# Patient Record
Sex: FEMALE | Race: WHITE | NOT HISPANIC OR LATINO | Employment: FULL TIME
[De-identification: names, ages, dates, MRNs, and addresses within clinical notes are randomized per-mention and may not be internally consistent; named-entity substitution may affect disease eponyms.]

---

## 2024-01-01 ENCOUNTER — HEALTH MAINTENANCE LETTER (OUTPATIENT)
Age: 59
End: 2024-01-01

## 2024-01-01 ENCOUNTER — PRE VISIT (OUTPATIENT)
Dept: ONCOLOGY | Facility: CLINIC | Age: 59
End: 2024-01-01

## 2024-01-01 ENCOUNTER — PATIENT OUTREACH (OUTPATIENT)
Dept: ONCOLOGY | Facility: CLINIC | Age: 59
End: 2024-01-01

## 2024-01-01 ENCOUNTER — TRANSFERRED RECORDS (OUTPATIENT)
Dept: HEALTH INFORMATION MANAGEMENT | Facility: CLINIC | Age: 59
End: 2024-01-01

## 2024-01-01 ENCOUNTER — TRANSCRIBE ORDERS (OUTPATIENT)
Dept: OTHER | Age: 59
End: 2024-01-01

## 2024-01-01 ENCOUNTER — ONCOLOGY VISIT (OUTPATIENT)
Dept: ONCOLOGY | Facility: CLINIC | Age: 59
End: 2024-01-01
Attending: OBSTETRICS & GYNECOLOGY

## 2024-01-01 VITALS
RESPIRATION RATE: 16 BRPM | BODY MASS INDEX: 21.95 KG/M2 | OXYGEN SATURATION: 95 % | SYSTOLIC BLOOD PRESSURE: 167 MMHG | HEART RATE: 95 BPM | DIASTOLIC BLOOD PRESSURE: 97 MMHG | HEIGHT: 69 IN | TEMPERATURE: 98 F | WEIGHT: 148.2 LBS

## 2024-01-01 DIAGNOSIS — C56.9 OVARIAN CANCER, UNSPECIFIED LATERALITY (H): Primary | ICD-10-CM

## 2024-01-01 DIAGNOSIS — C56.9: ICD-10-CM

## 2024-01-01 DIAGNOSIS — C56.1 OVARIAN CANCER, RIGHT (H): Primary | ICD-10-CM

## 2024-01-01 LAB
ALT SERPL-CCNC: 16 U/L (ref 9–52)
AST SERPL-CCNC: 37 U/L (ref 17–45)
CREATININE (EXTERNAL): 1 MG/DL (ref 0.7–1.5)
GFR ESTIMATED (EXTERNAL): 66 ML/MIN (ref 60–128)
GLUCOSE (EXTERNAL): 107 MG/DL (ref 70–110)
POTASSIUM (EXTERNAL): 4.2 MMOL/L (ref 3.5–5.5)

## 2024-01-01 PROCEDURE — 99205 OFFICE O/P NEW HI 60 MIN: CPT | Performed by: OBSTETRICS & GYNECOLOGY

## 2024-01-01 PROCEDURE — 99213 OFFICE O/P EST LOW 20 MIN: CPT | Performed by: OBSTETRICS & GYNECOLOGY

## 2024-01-01 ASSESSMENT — PAIN SCALES - GENERAL: PAINLEVEL: NO PAIN (0)

## 2024-03-22 NOTE — TELEPHONE ENCOUNTER
Action Taken  Date/Description  TJ     WT from NPS March 22, 2024 11:34 AM    Call from Pt's  Jay to schedule for Dx of 2nd Opinion Ovarian cancer, right (H) [C56.1]     Pt Dx'd: [facility]  by Dr. Roz Bunch  Has Pt been anywhere else for this condition/concern? [facility], [facility])  Records updated in Care Everywhere? [facility]   Prior history of Hematologist, Oncologist?  No  Previous Radiation: No  Genetic Testing conducted?  Would like to discuss further  Surgical procedures, biopsies? 2.19.24 [facility] Case: [id number]   Imaging done:   2.19.24 - Vaginal US  2.2.24 - CT AP    Any scheduled follow up's/imaging/surgical procedures/biopsies? No     *They will be traveling with family later today, so please don't call until Monday*

## 2024-03-22 NOTE — TELEPHONE ENCOUNTER
Action    Action Taken 3/22/24  Pt's  called back to advise pt is being referred to see Dr. Jazmyn Pitts.  11:57 AM

## 2024-03-22 NOTE — PROGRESS NOTES
New Patient Hematology / Oncology Nurse Navigator Note     Referral Date: 3/21/24    Referring provider: Per IB message from Dr. Pitts who discussed with referring MD directly/call from pt's  Jay Nuñez, Iban RIBEIRO MD   [address]   [address]   [place] [address]   Phone: [phone]   Fax: [phone]     Referring Clinic/Organization: [facility] (pt also met with [facility])     Referred to: GynOnc    Requested provider (if applicable): Dr. Pitts (Dr. Pitts to add 3/25)    Evaluation for : Carcinomatosis, concern for ovarian cancer     Clinical History (per Nurse review of records provided):    2/19/24 Path:  Final Dx    A.  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 Pitts from Dr Nuñez  Pt confirms they are aware of the referral and ready to schedule. Explained Dr. Pitts has offered to add patient Monday 3/25 at 10AM either in-person at [facility] or virtually. Pt would prefer in-person and Monday works for her.     Provided contact information if future questions arise.     -Transferred pt to NPS line [phone] to schedule appt per scheduling instructions.        Maribell Head, EVEN, RN, PHN, OCN  Hematology/Oncology Nurse Navigator  [facility]  [phone]

## 2024-03-22 NOTE — TELEPHONE ENCOUNTER
Action March 22, 2024 3:37 PM ABT   Action Taken Records from [facility] received and sent to HIM for upload. Images from [place] received and resolved to PACS     RECORDS STATUS - ALL OTHER DIAGNOSIS      [facility], [facility]   DATE RECEIVED:    NOTES STATUS DETAILS   OFFICE NOTE from referring provider [facility] 03/15/24: Dr. Iban Nuñez   OFFICE NOTE from medical oncologist [facility] 02/23/24: Dr. Rzo Bunch   OFFICE NOTE from other specialist External: [facility] 03/01/24: Luisana Nieto PA-C   MEDICATION LIST [facility]    LABS     PATHOLOGY REPORTS Report in [facility] 02/19/24: [id number]   ANYTHING RELATED TO DIAGNOSIS [facility] Most recent 02/19/24   IMAGING (NEED IMAGES & REPORT)     CT SCANS PACS RV:   02/19/24: CT Needle BX  02/02/24: CT AP   ULTRASOUND PACS RV:  02/09/24: US Pelvis

## 2024-03-25 PROBLEM — C56.9 OVARIAN CANCER, UNSPECIFIED LATERALITY (H): Status: ACTIVE | Noted: 2024-01-01

## 2024-03-25 NOTE — LETTER
3/25/2024         RE: Catalina Henley  [address] [address]        Dear Colleague,    Thank you for referring your patient, Catalina Henley, to the [facility]. Please see a copy of my visit note below.    GYNECOLOGIC  ONCOLOGY CONSULT    Referring provider:    Referred Self, MD  No address on file   RE: Catalina Henley  : 1965  ALEJANDRA: 3/25/2024    CC:  High grade serous ovarian cancer    HPI: Ms Catalina Henley is a 59 year old  female who presents for consultation regarding high grade serous ovarian cancer.     She presents with her  and sister for the consult.     Overall she prefers not to be involved with doctors and had not seen a doctor for nearly 17 yrs. 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  She presented to to clinic in early 2024 with several weeks history of lower abdominal pain, abdominal bloating.     Today she reports lower abdominal discomfort which is controlled with Advil. Decreased appetite and inability to tolerate some foods like vegetables. Continues to have daily stool, no vomiting. She is still very active but does have more fatigue recently. No vaginal bleeding or abnormal discharge.    It is important to her that during course of cancer treatment to not experience the very negative effects of cancer care she witnessed for her mother. Maintaining quality of life, being with her family is important.    Course to date  2024 CT A/P - Nodularity of omentum, nodularity of the peritoneal wall, multiple nodules in the mesial rectal fat, nodular thickening within rectouterine space, moderate volume ascites    24   -  2696  2/10/24 Pelvic US - numerous peritoneal lesions    24 CT guided biopsy of omental nodule  Pathology - metastatic carcinoma, consistent with high grade serous carcinoma, favoring tubo-ovarian    24 Consult with Gyn Oncology - declined Taxol and Carboplatin  "Neoadjuvant chemotherapy  3/15/24 consult Palliative Medicine Dr. Iban Nuñez, referred for second consult      OBGYN history and Health Maintenance:    Last Pap Smear: never  Last Mammogram:never  Last Colonoscopy: never       Past Medical History:   Diagnosis Date     Migraine with aura and without status migrainosus, not intractable        Past Surgical History:   Procedure Laterality Date     KNEE SURGERY       LAPAROSCOPIC TUBAL LIGATION Bilateral         No current outpatient medications on file.       No Known Allergies    Social History:  Social History     Tobacco Use     Smoking status: Never     Passive exposure: Never     Smokeless tobacco: Never   Substance Use Topics     Alcohol use: Never       Family History:   The patient's family history is notable for mother who passed away from endometrial cancer  No family history on file.      Physical Exam:     BP (!) 167/97   Pulse 95   Temp 98  F (36.7  C) (Oral)   Resp 16   Ht 1.74 m (5' 8.5\")   Wt 67.2 kg (148 lb 3.2 oz)   SpO2 95%   BMI 22.21 kg/m    Body mass index is 22.21 kg/m .    General: Alert and oriented, no acute distress  Psych: Mood stable  Cardiovascular: RRR, no murmors  Pulmonary: Lungs clear . Normal respiratory effort  GI: Lower pelvic mass, extending to level of umbilicus, mobile, minimal fluid shift  Lymph: No enlarge lymph nodes in neck or groin  : Normal external genitalia. 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    Mild cancer associated pain.     At this visit I reviewed the imaging, omental biopsy results and course of care to date and provided the below recommendation to Catalina and her family. We discussed at length Catalina's and her families value and her priority to " maintain a good quality of life for as long as possible. After a discussion of various approaches to ovarian cancer, together we arrived at the below treatment plan.    We will continue to support a comprehensive cancer care and can consider interventions such as paracentesis and close collaboration with palliative medicine as needed.       Plan:     1.)    Ovarian Cancer- at least Stage IIIC, currently the best approach to management is neoadjuvant    chemotherapy. It is appropriate to proceed with single agent Carboplatin IV AUC 6.    She agrees to proceed with cycle 1 and then reassess her tolerance and make decisions about subsequent cycles. She does not want hair loss.    If tolerated the overall plan is to administer 3-4 cycles and obtain CT Chest, abdomen and pelvis for disease response. Depending on response we would discuss role of surgical management with goal of optimal tumor resection.      She can decided to stop chemotherapy at anytime if it is interfering with how she wants to live her life    Will send off omental biopsy sample for CARIS tumor profiling testing. Will review results including potential role of PARP inhibitor pending results.    2.) Genetic risk factors were assessed: referral placed     3.) Labs and/or tests ordered include:  obtain pre-chemo labs including .     4.) Needs to connect with social work and financial team as she is self pay      Jazmyn Pitts M.D., MPH,  F.A.C.O.G.  Professor  Department of Ob/Gyn and Women's Health  Division of Gynecologic Oncology  [facility]/[employer] [place]  [phone]        Time: total time spent today, 60 , including preparation, review of outside records, face to face counseling, and documentation.            Again, thank you for allowing me to participate in the care of your patient.        Sincerely,        Jazmyn Pitts MD

## 2024-03-25 NOTE — PROGRESS NOTES
Writer received referral to Cancer Risk Management/Genetic Counseling.    Referred for: ovarian cancer, need genetic testing for treatment plan      Referral reviewed for appropriate plan, and sent to New Patient Scheduling ([phone]) for completion.    Renee Logan, RN, BSN  Oncology New Patient Nurse Navigator   [facility]  [phone]

## 2024-03-25 NOTE — PROGRESS NOTES
[facility] Requisition form completed and faxed along with all needed records per MD request    Keshia Murray RN

## 2024-03-25 NOTE — NURSING NOTE
"Oncology Rooming Note    March 25, 2024 9:59 AM   Catalina Henley is a 59 year old female who presents for:    Chief Complaint   Patient presents with    Oncology Clinic Visit     Ovarian cancer     Initial Vitals: BP (!) 167/97   Pulse 95   Temp 98  F (36.7  C) (Oral)   Resp 16   Ht 1.74 m (5' 8.5\")   Wt 67.2 kg (148 lb 3.2 oz)   SpO2 95%   BMI 22.21 kg/m   Estimated body mass index is 22.21 kg/m  as calculated from the following:    Height as of this encounter: 1.74 m (5' 8.5\").    Weight as of this encounter: 67.2 kg (148 lb 3.2 oz). Body surface area is 1.8 meters squared.  No Pain (0) Comment: Data Unavailable   No LMP recorded.  Allergies reviewed: Yes  Medications reviewed: Yes    Medications: Medication refills not needed today.  Pharmacy name entered into EPIC: Data Unavailable    Frailty Screening:   Is the patient here for a new oncology consult visit in cancer care? 2. No      Clinical concerns: None       Kendal Escobar CMA            "

## 2024-03-25 NOTE — PROGRESS NOTES
GYNECOLOGIC  ONCOLOGY CONSULT    Referring provider:    Referred Self, MD  No address on file   RE: Catalina Henley  : 1965  ALEJANDRA: 3/25/2024    CC:  High grade serous ovarian cancer    HPI: Ms Catalina Henley is a 59 year old  female who presents for consultation regarding high grade serous ovarian cancer.     She presents with her  and sister for the consult.     Overall she prefers not to be involved with doctors and had not seen a doctor for nearly 17 yrs. 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  She presented to to clinic in early 2024 with several weeks history of lower abdominal pain, abdominal bloating.     Today she reports lower abdominal discomfort which is controlled with Advil. Decreased appetite and inability to tolerate some foods like vegetables. Continues to have daily stool, no vomiting. She is still very active but does have more fatigue recently. No vaginal bleeding or abnormal discharge.    It is important to her that during course of cancer treatment to not experience the very negative effects of cancer care she witnessed for her mother. Maintaining quality of life, being with her family is important.    Course to date  2024 CT A/P - Nodularity of omentum, nodularity of the peritoneal wall, multiple nodules in the mesial rectal fat, nodular thickening within rectouterine space, moderate volume ascites    24   -  2696  2/10/24 Pelvic US - numerous peritoneal lesions    24 CT guided biopsy of omental nodule  Pathology - metastatic carcinoma, consistent with high grade serous carcinoma, favoring tubo-ovarian    24 Consult with Gyn Oncology - declined Taxol and Carboplatin Neoadjuvant chemotherapy  3/15/24 consult Palliative Medicine Dr. Iban Nuñez, referred for second consult      OBGYN history and Health Maintenance:    Last Pap Smear: never  Last Mammogram:never  Last Colonoscopy: never       Past Medical History:  "  Diagnosis Date    Migraine with aura and without status migrainosus, not intractable        Past Surgical History:   Procedure Laterality Date    KNEE SURGERY      LAPAROSCOPIC TUBAL LIGATION Bilateral         No current outpatient medications on file.       No Known Allergies    Social History:  Social History     Tobacco Use    Smoking status: Never     Passive exposure: Never    Smokeless tobacco: Never   Substance Use Topics    Alcohol use: Never       Family History:   The patient's family history is notable for mother who passed away from endometrial cancer  No family history on file.      Physical Exam:     BP (!) 167/97   Pulse 95   Temp 98  F (36.7  C) (Oral)   Resp 16   Ht 1.74 m (5' 8.5\")   Wt 67.2 kg (148 lb 3.2 oz)   SpO2 95%   BMI 22.21 kg/m    Body mass index is 22.21 kg/m .    General: Alert and oriented, no acute distress  Psych: Mood stable  Cardiovascular: RRR, no murmors  Pulmonary: Lungs clear . Normal respiratory effort  GI: Lower pelvic mass, extending to level of umbilicus, mobile, minimal fluid shift  Lymph: No enlarge lymph nodes in neck or groin  : Normal external genitalia. 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    Mild cancer associated pain.     At this visit I reviewed the imaging, omental biopsy results and course of care to date and provided the below recommendation to Catalina and her family. We discussed at length Catalina's and her families value and her priority to maintain a good quality of life for as long as possible. After a discussion of various approaches to ovarian cancer, together we arrived at the below treatment plan.    We will continue to support a comprehensive cancer care and can consider interventions such as " paracentesis and close collaboration with palliative medicine as needed.       Plan:     1.)    Ovarian Cancer- at least Stage IIIC, currently the best approach to management is neoadjuvant    chemotherapy. It is appropriate to proceed with single agent Carboplatin IV AUC 6.    She agrees to proceed with cycle 1 and then reassess her tolerance and make decisions about subsequent cycles. She does not want hair loss.    If tolerated the overall plan is to administer 3-4 cycles and obtain CT Chest, abdomen and pelvis for disease response. Depending on response we would discuss role of surgical management with goal of optimal tumor resection.      She can decided to stop chemotherapy at anytime if it is interfering with how she wants to live her life    Will send off omental biopsy sample for CARIS tumor profiling testing. Will review results including potential role of PARP inhibitor pending results.    2.) Genetic risk factors were assessed: referral placed     3.) Labs and/or tests ordered include:  obtain pre-chemo labs including .     4.) Needs to connect with social work and financial team as she is self pay      Jazmyn Pitts M.D., MPH,  F.A.C.O.G.  Professor  Department of Ob/Gyn and Women's Health  Division of Gynecologic Oncology  [facility]/[employer] [place]  [phone]        Time: total time spent today, 60 , including preparation, review of outside records, face to face counseling, and documentation.

## 2024-03-25 NOTE — PROGRESS NOTES
[facility]: Cancer Care Initial Note                                    Discussion with Patient:                                                      Chemotherapy Teaching              Assessment:                                                    Plan of Care Education Review:   Chemotherapy education was completed today with patient in person/via phone.    Chemotherapy handouts given and packet were given to patient/ mailed to home address.    Chemotherapy education visit with NP will be arranged.     Chemotherapy Regimen, schedule and follow up appointment plan: [facility] ONLY      Test and Procedures needed prior to chemotherapy beginning:     General Chemotherapy information:  Names of Medications to be given  Possible side effects and how to manage side effects  Reinforced  the importance of infection prevention  Reviewed signs and symptoms that should be reported to your doctor/clinic/on call doctor    Provided with phone numbers to contact care coordinator, clinic, triage, and on call doctors     No barriers to learning identified.  Patient verbalized understanding of all written and verbal information.  All questions answered.  Patient in agreement with plan.       Patient will be receiving a phone call from scheduling to make all future chemotherapy appointments.     Asked patient to call with any questions or concerns.     Nabil AYALA, BSN  Gyn Oncology   Care Coordinator  [facility]  [phone]                Intervention/Education provided during outreach:                                                       Scheduling request sent    Patient prefers [place] location     Follow up call in 1 week post chemotherapy    Signature:  Keshia Murray RN

## 2024-03-26 NOTE — PROGRESS NOTES
RN reached out to patient/ with update on scheduling and check in on any questions and concerns that may have come up after the visit     Patient has decided not to move forward and has asked that nothing be scheduled     Patient and  state they have had a long discussion and patient has decided that she does not want any type of treatment at this time     They asked that any appointment requests be cancelled     They asked that CARIS not be completed     They are appreciative of all of Dr Pitts's time but decline recommendations     RN updated scheduling team and Infusion financing. RN also reached out to [facility] Murray RN

## 2024-03-27 NOTE — PROGRESS NOTES
[facility] confirmed they have cancelled requisition request per patient and  request     Keshia Murray RN